# Patient Record
Sex: MALE | Race: WHITE | NOT HISPANIC OR LATINO | Employment: FULL TIME | URBAN - METROPOLITAN AREA
[De-identification: names, ages, dates, MRNs, and addresses within clinical notes are randomized per-mention and may not be internally consistent; named-entity substitution may affect disease eponyms.]

---

## 2022-05-03 ENCOUNTER — HOSPITAL ENCOUNTER (OUTPATIENT)
Dept: CARDIOLOGY | Facility: HOSPITAL | Age: 32
Discharge: HOME | End: 2022-05-03

## 2022-05-03 ENCOUNTER — TRANSCRIBE ORDERS (OUTPATIENT)
Dept: CARDIOLOGY | Facility: CLINIC | Age: 32
End: 2022-05-03

## 2022-05-03 DIAGNOSIS — Z00.00 ROUTINE GENERAL MEDICAL EXAMINATION AT A HEALTH CARE FACILITY: ICD-10-CM

## 2022-05-03 DIAGNOSIS — Z00.00 ROUTINE GENERAL MEDICAL EXAMINATION AT A HEALTH CARE FACILITY: Primary | ICD-10-CM

## 2022-05-03 LAB
BH CV STRESS BP STAGE 1: NORMAL
BH CV STRESS BP STAGE 2: NORMAL
BH CV STRESS BP STAGE 3: NORMAL
BH CV STRESS BP STAGE 4: NORMAL
BH CV STRESS BP STAGE 5: NORMAL
BH CV STRESS DURATION MIN STAGE 1: 3
BH CV STRESS DURATION MIN STAGE 2: 3
BH CV STRESS DURATION MIN STAGE 3: 3
BH CV STRESS DURATION MIN STAGE 4: 3
BH CV STRESS DURATION MIN STAGE 5: 3
BH CV STRESS DURATION SEC STAGE 1: 0
BH CV STRESS DURATION SEC STAGE 2: 0
BH CV STRESS DURATION SEC STAGE 3: 0
BH CV STRESS DURATION SEC STAGE 4: 0
BH CV STRESS DURATION SEC STAGE 5: 0
BH CV STRESS GRADE STAGE 1: 10
BH CV STRESS GRADE STAGE 2: 12
BH CV STRESS GRADE STAGE 3: 14
BH CV STRESS GRADE STAGE 4: 16
BH CV STRESS GRADE STAGE 5: 18
BH CV STRESS HR STAGE 1: 71
BH CV STRESS HR STAGE 2: 89
BH CV STRESS HR STAGE 3: 109
BH CV STRESS HR STAGE 4: 125
BH CV STRESS HR STAGE 5: 164
BH CV STRESS METS STAGE 1: 5
BH CV STRESS METS STAGE 2: 7.5
BH CV STRESS METS STAGE 3: 10
BH CV STRESS METS STAGE 4: 13.5
BH CV STRESS METS STAGE 5: 15
BH CV STRESS PROTOCOL 1: NORMAL
BH CV STRESS RECOVERY BP: NORMAL MMHG
BH CV STRESS RECOVERY HR: 82 BPM
BH CV STRESS SPEED STAGE 1: 1.7
BH CV STRESS SPEED STAGE 2: 2.5
BH CV STRESS SPEED STAGE 3: 3.4
BH CV STRESS SPEED STAGE 4: 4.2
BH CV STRESS SPEED STAGE 5: 5
BH CV STRESS STAGE 1: 1
BH CV STRESS STAGE 2: 2
BH CV STRESS STAGE 3: 3
BH CV STRESS STAGE 4: 4
BH CV STRESS STAGE 5: 5
MAXIMAL PREDICTED HEART RATE: 186 BPM
PERCENT MAX PREDICTED HR: 88.17 %
STRESS BASELINE BP: NORMAL MMHG
STRESS BASELINE HR: 54 BPM
STRESS PERCENT HR: 104 %
STRESS POST ESTIMATED WORKLOAD: 15 METS
STRESS POST EXERCISE DUR MIN: 15 MIN
STRESS POST EXERCISE DUR SEC: 0 SEC
STRESS POST PEAK BP: NORMAL MMHG
STRESS POST PEAK HR: 164 BPM
STRESS TARGET HR: 158 BPM

## 2022-05-03 PROCEDURE — 93016 CV STRESS TEST SUPVJ ONLY: CPT | Performed by: INTERNAL MEDICINE

## 2022-05-03 PROCEDURE — 93018 CV STRESS TEST I&R ONLY: CPT | Performed by: INTERNAL MEDICINE

## 2022-05-03 PROCEDURE — 93017 CV STRESS TEST TRACING ONLY: CPT

## 2022-05-04 ENCOUNTER — TRANSCRIBE ORDERS (OUTPATIENT)
Dept: CARDIOLOGY | Facility: CLINIC | Age: 32
End: 2022-05-04

## 2024-02-09 ENCOUNTER — OFFICE VISIT (OUTPATIENT)
Dept: GASTROENTEROLOGY | Facility: CLINIC | Age: 34
End: 2024-02-09
Payer: COMMERCIAL

## 2024-02-09 ENCOUNTER — TELEPHONE (OUTPATIENT)
Dept: GASTROENTEROLOGY | Facility: CLINIC | Age: 34
End: 2024-02-09
Payer: COMMERCIAL

## 2024-02-09 VITALS
TEMPERATURE: 97.7 F | HEIGHT: 67 IN | OXYGEN SATURATION: 98 % | HEART RATE: 48 BPM | DIASTOLIC BLOOD PRESSURE: 91 MMHG | BODY MASS INDEX: 32.65 KG/M2 | SYSTOLIC BLOOD PRESSURE: 132 MMHG | WEIGHT: 208 LBS

## 2024-02-09 DIAGNOSIS — R10.13 DYSPEPSIA: Primary | ICD-10-CM

## 2024-02-09 DIAGNOSIS — R11.10 REGURGITATION OF FOOD: ICD-10-CM

## 2024-02-09 PROCEDURE — 99204 OFFICE O/P NEW MOD 45 MIN: CPT | Performed by: NURSE PRACTITIONER

## 2024-02-09 RX ORDER — HYDROCODONE BITARTRATE AND ACETAMINOPHEN 5; 325 MG/1; MG/1
TABLET ORAL
COMMUNITY
Start: 2023-10-30 | End: 2024-02-09

## 2024-02-09 RX ORDER — PAROXETINE HYDROCHLORIDE 20 MG/1
1 TABLET, FILM COATED ORAL NIGHTLY
COMMUNITY

## 2024-02-09 RX ORDER — CEPHALEXIN 500 MG/1
1 CAPSULE ORAL EVERY 12 HOURS SCHEDULED
COMMUNITY
Start: 2023-10-30 | End: 2024-02-09

## 2024-02-09 NOTE — PROGRESS NOTES
Chief Complaint   Patient presents with    Heartburn       HPI    Vidal Sales is a  33 y.o. male here to establish care as a new patient for complaints of dyspepsia.    This patient will also follow with Dr. Meeks.    Patient reports several years of dyspeptic symptoms.  Symptoms wax and wane in severity.  Can often experience regurgitation of stomach contents.  No nausea, vomiting, dysphagia or odynophagia. His appetite is good.  His weight is stable.  He has been self managing symptoms with over-the-counter H2 blockers and PPI.  He is currently on esomeprazole 20 mg OTC once daily.  He does not smoke.  He rarely drinks alcohol.  He rarely uses NSAIDs.    No diarrhea, constipation or rectal bleeding.  No family history of colon cancer.      He works as a .  His wife is a physician assistant here at [facility] [employer].  He has 3 young children.    He just had lab work for annual wellness unavailable for review.  We will request results.    Past Medical History:   Diagnosis Date    GERD (gastroesophageal reflux disease) 01/2015       History reviewed. No pertinent surgical history.    Scheduled Meds:     Continuous Infusions: No current facility-administered medications for this visit.      PRN Meds:     No Known Allergies    Social History     Socioeconomic History    Marital status:    Tobacco Use    Smoking status: Never   Substance and Sexual Activity    Alcohol use: Yes     Alcohol/week: 5.0 standard drinks of alcohol     Types: 2 Glasses of wine, 3 Drinks containing 0.5 oz of alcohol per week    Drug use: Never    Sexual activity: Yes     Partners: Female     Birth control/protection: Vasectomy       History reviewed. No pertinent family history.    Review of Systems   Gastrointestinal:         + dyspepsia        Vitals:    02/09/24 1305   BP: 132/91   Pulse: (!) 48   Temp: 97.7 °F (36.5 °C)   SpO2: 98%       Physical Exam  Constitutional:       Appearance: He is well-developed.   Abdominal:       General: Bowel sounds are normal. There is no distension.      Palpations: Abdomen is soft. There is no mass.      Tenderness: There is no abdominal tenderness. There is no guarding.      Hernia: No hernia is present.   Skin:     General: Skin is warm and dry.      Capillary Refill: Capillary refill takes less than 2 seconds.   Neurological:      Mental Status: He is alert and oriented to person, place, and time.   Psychiatric:         Behavior: Behavior normal.     Assessment    Diagnoses and all orders for this visit:    1. Dyspepsia (Primary)  -     Case Request; Standing  -     Obtain Informed Consent; Standing  -     Prepare and Witness Surgical Consent Form; Standing  -     Case Request    2. Regurgitation of food  -     Case Request; Standing  -     Obtain Informed Consent; Standing  -     Prepare and Witness Surgical Consent Form; Standing  -     Case Request    Plan    Schedule EGD with Dr. Meeks for dyspeptic symptoms and regurgitation of food to rule out acid related damage, peptic ulcer disease, Terrell's esophagus, H. pylori infection, hiatal hernia, celiac disease, etc.  Risk/benefits of procedure reviewed with the patient and all questions were answered  Offered Protonix 40 mg once daily but patient prefers to stay on over-the-counter esomeprazole for now.  Antireflux measures and dietary modifications reviewed. Low acid diet reviewed. Keep head of bed elevated. Stop eating/drinking at least 3 hours prior to bedtime. Eliminate caffeine and carbonated beverages.  Weight loss encouraged if BMI over 25.  Obtain a copy of labs from primary care provider for review.  Further recommendations pending endoscopic examination.         MARIA DOLORES Lares  [facility]  [address] [place] [address]  Office: [phone]

## 2024-02-09 NOTE — Clinical Note
I need a copy of the labs he had performed for a wellness exam please call [phone] to request thanks

## 2024-02-09 NOTE — H&P (VIEW-ONLY)
Chief Complaint   Patient presents with    Heartburn       HPI    Vidal Sales is a  33 y.o. male here to establish care as a new patient for complaints of dyspepsia.    This patient will also follow with Dr. Meeks.    Patient reports several years of dyspeptic symptoms.  Symptoms wax and wane in severity.  Can often experience regurgitation of stomach contents.  No nausea, vomiting, dysphagia or odynophagia. His appetite is good.  His weight is stable.  He has been self managing symptoms with over-the-counter H2 blockers and PPI.  He is currently on esomeprazole 20 mg OTC once daily.  He does not smoke.  He rarely drinks alcohol.  He rarely uses NSAIDs.    No diarrhea, constipation or rectal bleeding.  No family history of colon cancer.      He works as a .  His wife is a physician assistant here at [employer].  He has 3 young children.    He just had lab work for annual wellness unavailable for review.  We will request results.    Past Medical History:   Diagnosis Date    GERD (gastroesophageal reflux disease) 01/2015       History reviewed. No pertinent surgical history.    Scheduled Meds:     Continuous Infusions: No current facility-administered medications for this visit.      PRN Meds:     No Known Allergies    Social History     Socioeconomic History    Marital status:    Tobacco Use    Smoking status: Never   Substance and Sexual Activity    Alcohol use: Yes     Alcohol/week: 5.0 standard drinks of alcohol     Types: 2 Glasses of wine, 3 Drinks containing 0.5 oz of alcohol per week    Drug use: Never    Sexual activity: Yes     Partners: Female     Birth control/protection: Vasectomy       History reviewed. No pertinent family history.    Review of Systems   Gastrointestinal:         + dyspepsia        Vitals:    02/09/24 1305   BP: 132/91   Pulse: (!) 48   Temp: 97.7 °F (36.5 °C)   SpO2: 98%       Physical Exam  Constitutional:       Appearance: He is well-developed.   Abdominal:       General: Bowel sounds are normal. There is no distension.      Palpations: Abdomen is soft. There is no mass.      Tenderness: There is no abdominal tenderness. There is no guarding.      Hernia: No hernia is present.   Skin:     General: Skin is warm and dry.      Capillary Refill: Capillary refill takes less than 2 seconds.   Neurological:      Mental Status: He is alert and oriented to person, place, and time.   Psychiatric:         Behavior: Behavior normal.     Assessment    Diagnoses and all orders for this visit:    1. Dyspepsia (Primary)  -     Case Request; Standing  -     Obtain Informed Consent; Standing  -     Prepare and Witness Surgical Consent Form; Standing  -     Case Request    2. Regurgitation of food  -     Case Request; Standing  -     Obtain Informed Consent; Standing  -     Prepare and Witness Surgical Consent Form; Standing  -     Case Request    Plan    Schedule EGD with Dr. Meeks for dyspeptic symptoms and regurgitation of food to rule out acid related damage, peptic ulcer disease, Terrell's esophagus, H. pylori infection, hiatal hernia, celiac disease, etc.  Risk/benefits of procedure reviewed with the patient and all questions were answered  Offered Protonix 40 mg once daily but patient prefers to stay on over-the-counter esomeprazole for now.  Antireflux measures and dietary modifications reviewed. Low acid diet reviewed. Keep head of bed elevated. Stop eating/drinking at least 3 hours prior to bedtime. Eliminate caffeine and carbonated beverages.  Weight loss encouraged if BMI over 25.  Obtain a copy of labs from primary care provider for review.  Further recommendations pending endoscopic examination.         MARIA DOLORES Lares  [facility]  [address] [place] [address]  Office: [phone]

## 2024-02-09 NOTE — TELEPHONE ENCOUNTER
JESSICA RUBIO IN SCHEDULING PT SCHEDULED 02/16/2024 ARRIVING AT 1200PM.EGD PREP INSTRUCTIONS HANDED TO THE PT.OK FOR HUB TO READ

## 2024-02-14 RX ORDER — DIPHENOXYLATE HYDROCHLORIDE AND ATROPINE SULFATE 2.5; .025 MG/1; MG/1
1 TABLET ORAL DAILY
COMMUNITY

## 2024-02-14 RX ORDER — CHLORAL HYDRATE 500 MG
1000 CAPSULE ORAL
COMMUNITY

## 2024-02-15 ENCOUNTER — TELEPHONE (OUTPATIENT)
Dept: GASTROENTEROLOGY | Facility: CLINIC | Age: 34
End: 2024-02-15
Payer: COMMERCIAL

## 2024-02-16 ENCOUNTER — ANESTHESIA (OUTPATIENT)
Dept: GASTROENTEROLOGY | Facility: HOSPITAL | Age: 34
End: 2024-02-16
Payer: COMMERCIAL

## 2024-02-16 ENCOUNTER — ANESTHESIA EVENT (OUTPATIENT)
Dept: GASTROENTEROLOGY | Facility: HOSPITAL | Age: 34
End: 2024-02-16
Payer: COMMERCIAL

## 2024-02-16 ENCOUNTER — HOSPITAL ENCOUNTER (OUTPATIENT)
Facility: HOSPITAL | Age: 34
Setting detail: HOSPITAL OUTPATIENT SURGERY
Discharge: HOME | End: 2024-02-16
Attending: INTERNAL MEDICINE | Admitting: INTERNAL MEDICINE
Payer: COMMERCIAL

## 2024-02-16 VITALS
WEIGHT: 208 LBS | DIASTOLIC BLOOD PRESSURE: 78 MMHG | OXYGEN SATURATION: 97 % | RESPIRATION RATE: 16 BRPM | SYSTOLIC BLOOD PRESSURE: 108 MMHG | BODY MASS INDEX: 32.58 KG/M2 | HEART RATE: 45 BPM

## 2024-02-16 DIAGNOSIS — R10.13 DYSPEPSIA: ICD-10-CM

## 2024-02-16 DIAGNOSIS — R11.10 REGURGITATION OF FOOD: ICD-10-CM

## 2024-02-16 PROCEDURE — 88305 TISSUE EXAM BY PATHOLOGIST: CPT | Performed by: INTERNAL MEDICINE

## 2024-02-16 PROCEDURE — 25010000002 PROPOFOL 10 MG/ML EMULSION

## 2024-02-16 PROCEDURE — 25010000002 GLYCOPYRROLATE 0.2 MG/ML SOLUTION

## 2024-02-16 PROCEDURE — 43239 EGD BIOPSY SINGLE/MULTIPLE: CPT | Performed by: INTERNAL MEDICINE

## 2024-02-16 PROCEDURE — 25810000003 LACTATED RINGERS PER 1000 ML: Performed by: INTERNAL MEDICINE

## 2024-02-16 RX ORDER — PROPOFOL 10 MG/ML
VIAL (ML) INTRAVENOUS CONTINUOUS PRN
Status: DISCONTINUED | OUTPATIENT
Start: 2024-02-16 | End: 2024-02-16 | Stop reason: SURG

## 2024-02-16 RX ORDER — GLYCOPYRROLATE 0.2 MG/ML
INJECTION INTRAMUSCULAR; INTRAVENOUS AS NEEDED
Status: DISCONTINUED | OUTPATIENT
Start: 2024-02-16 | End: 2024-02-16 | Stop reason: SURG

## 2024-02-16 RX ORDER — LIDOCAINE HYDROCHLORIDE 20 MG/ML
INJECTION, SOLUTION INFILTRATION; PERINEURAL AS NEEDED
Status: DISCONTINUED | OUTPATIENT
Start: 2024-02-16 | End: 2024-02-16 | Stop reason: SURG

## 2024-02-16 RX ORDER — SODIUM CHLORIDE, SODIUM LACTATE, POTASSIUM CHLORIDE, CALCIUM CHLORIDE 600; 310; 30; 20 MG/100ML; MG/100ML; MG/100ML; MG/100ML
1000 INJECTION, SOLUTION INTRAVENOUS CONTINUOUS
Status: DISCONTINUED | OUTPATIENT
Start: 2024-02-16 | End: 2024-02-16 | Stop reason: HOSPADM

## 2024-02-16 RX ADMIN — PROPOFOL 100 MG: 10 INJECTION, EMULSION INTRAVENOUS at 13:04

## 2024-02-16 RX ADMIN — LIDOCAINE HYDROCHLORIDE 60 MG: 20 INJECTION, SOLUTION INFILTRATION; PERINEURAL at 13:04

## 2024-02-16 RX ADMIN — PROPOFOL 160 MCG/KG/MIN: 10 INJECTION, EMULSION INTRAVENOUS at 13:05

## 2024-02-16 RX ADMIN — GLYCOPYRROLATE 0.2 MG: 0.2 INJECTION INTRAMUSCULAR; INTRAVENOUS at 13:09

## 2024-02-16 RX ADMIN — SODIUM CHLORIDE, POTASSIUM CHLORIDE, SODIUM LACTATE AND CALCIUM CHLORIDE 1000 ML: 600; 310; 30; 20 INJECTION, SOLUTION INTRAVENOUS at 12:44

## 2024-02-16 NOTE — DISCHARGE INSTRUCTIONS

## 2024-02-16 NOTE — BRIEF OP NOTE
ESOPHAGOGASTRODUODENOSCOPY  Progress Note    Vidal Sales  2/16/2024    Pre-op Diagnosis:   Dyspepsia [R10.13]  Regurgitation of food [R11.10]       Post-Op Diagnosis Codes:     * Dyspepsia [R10.13]     * Regurgitation of food [R11.10]     * Gastritis [K29.70]     * Hiatal hernia [K44.9]     * Esophagitis [K20.90]    Procedure/CPT® Codes:        Procedure(s):  ESOPHAGOGASTRODUODENOSCOPY with biopsies              Surgeon(s):  Salvador Meeks MD    Anesthesia: Monitored Anesthesia Care    Staff:   Endo Technician: Bhargavi Medellin  Endo Nurse: Jessica Loco RN         Estimated Blood Loss: minimal    Urine Voided: * No values recorded between 2/16/2024  1:02 PM and 2/16/2024  1:14 PM *    Specimens:                Specimens       ID Source Type Tests Collected By Collected At Frozen?    A Gastric, Antrum Tissue TISSUE PATHOLOGY EXAM   Salvador Meeks MD 2/16/24 1312     Description: antral tissue biopsies    B Esophagus, Distal Tissue TISSUE PATHOLOGY EXAM   Salvador Meeks MD 2/16/24 1313                   Drains: * No LDAs found *    Findings: EGD with biopsy revealed salmon-colored mucosa times several fingers approximately 1 to 1.2 cm distal esophagus biopsies obtained to rule out Terrell's esophagus.  Medium size hiatal hernia was identified with antral gastritis biopsies of the antrum were obtained.  Duodenum was normal throughout.        Complications: None          Salvador Meeks MD     Date: 2/16/2024  Time: 13:16 EST

## 2024-02-19 LAB
LAB AP CASE REPORT: NORMAL
PATH REPORT.FINAL DX SPEC: NORMAL
PATH REPORT.GROSS SPEC: NORMAL

## 2024-03-13 ENCOUNTER — OFFICE VISIT (OUTPATIENT)
Dept: GASTROENTEROLOGY | Facility: CLINIC | Age: 34
End: 2024-03-13
Payer: COMMERCIAL

## 2024-03-13 VITALS
OXYGEN SATURATION: 97 % | BODY MASS INDEX: 29.92 KG/M2 | SYSTOLIC BLOOD PRESSURE: 121 MMHG | TEMPERATURE: 98.6 F | HEIGHT: 70 IN | DIASTOLIC BLOOD PRESSURE: 81 MMHG | WEIGHT: 209 LBS | HEART RATE: 49 BPM

## 2024-03-13 DIAGNOSIS — K44.9 HIATAL HERNIA: ICD-10-CM

## 2024-03-13 DIAGNOSIS — K21.00 GASTROESOPHAGEAL REFLUX DISEASE WITH ESOPHAGITIS WITHOUT HEMORRHAGE: Primary | ICD-10-CM

## 2024-03-13 PROCEDURE — 99214 OFFICE O/P EST MOD 30 MIN: CPT | Performed by: NURSE PRACTITIONER

## 2024-03-13 RX ORDER — PANTOPRAZOLE SODIUM 40 MG/1
40 TABLET, DELAYED RELEASE ORAL DAILY
Qty: 90 TABLET | Refills: 0 | Status: SHIPPED | OUTPATIENT
Start: 2024-03-13

## 2024-03-13 NOTE — PROGRESS NOTES
Chief Complaint   Patient presents with    Dyspepsia       HPI    Vidal Sales is a  34 y.o. male here for a follow up visit for dyspepsia status post endoscopic examination.    This patient follows with Dr. Meeks and myself.    He underwent EGD 2/16/2024 reviewed with findings of grade C reflux esophagitis.  Medium size hiatal hernia.  Gastritis.  Normal duodenum.  Gastritis noted on biopsy, negative for H. pylori and Terrell's esophagus.    On visit today he reports reduction in dyspeptic symptoms on over-the-counter Nexium.  Denies odynophagia, dysphagia, nausea or vomiting.  He is trying to adhere to a antireflux diet which is often a challenge given his busy work schedule as a .  Appetite is good.  His weight is stable.    No lower GI complaints.    Past Medical History:   Diagnosis Date    GERD (gastroesophageal reflux disease) 01/2015       Past Surgical History:   Procedure Laterality Date    ENDOSCOPY N/A 2/16/2024    Procedure: ESOPHAGOGASTRODUODENOSCOPY with biopsies;  Surgeon: Salvador Meeks MD;  [facility] ENDOSCOPY;  Service: Gastroenterology;  Laterality: N/A;  pre: dyspepsia  post: esophagitis, gastritis, hiatal hernia    NO PAST SURGERIES         Scheduled Meds:     Continuous Infusions: No current facility-administered medications for this visit.      PRN Meds:     No Known Allergies    Social History     Socioeconomic History    Marital status:    Tobacco Use    Smoking status: Never   Substance and Sexual Activity    Alcohol use: Yes     Alcohol/week: 5.0 standard drinks of alcohol     Types: 2 Glasses of wine, 3 Drinks containing 0.5 oz of alcohol per week    Drug use: Never    Sexual activity: Yes     Partners: Female     Birth control/protection: Vasectomy       Family History   Problem Relation Age of Onset    Malig Hyperthermia Neg Hx        Review of Systems   HENT:  Negative for trouble swallowing.    Gastrointestinal:         + Dyspepsia       Vitals:     03/13/24 1011   BP: 121/81   Pulse: (!) 49   Temp: 98.6 °F (37 °C)   SpO2: 97%       Physical Exam  Constitutional:       Appearance: He is well-developed.   Abdominal:      General: Bowel sounds are normal. There is no distension.      Palpations: Abdomen is soft. There is no mass.      Tenderness: There is no abdominal tenderness. There is no guarding.      Hernia: No hernia is present.   Skin:     General: Skin is warm and dry.      Capillary Refill: Capillary refill takes less than 2 seconds.   Neurological:      Mental Status: He is alert and oriented to person, place, and time.   Psychiatric:         Behavior: Behavior normal.     Assessment    Diagnoses and all orders for this visit:    1. Gastroesophageal reflux disease with esophagitis without hemorrhage (Primary)    2. Hiatal hernia    Other orders  -     pantoprazole (PROTONIX) 40 MG EC tablet; Take 1 tablet by mouth Daily.  Dispense: 90 tablet; Refill: 0       Plan    Delightful 34-year-old male seen today in follow-up for dyspeptic symptoms status post EGD as summarized above.  Reviewed procedural findings with the patient as well as pathology and all questions were answered.  Reiterated antireflux measures and dietary modifications. Low acid diet. Keep head of bed elevated. Stop eating/drinking at least 3 hours prior to bedtime. Eliminate caffeine and carbonated beverages.  We will give 3 months of Protonix 40 mg once daily to heal inflammation at which point patient can reduce down to over-the-counter PPI therapy and potentially Pepcid.  He is to call us with any issues otherwise we will see him back as needed.         MARIA DOLORES Lares  [facility]  [address] [place] [address]  Office: [phone]

## 2024-06-07 RX ORDER — PANTOPRAZOLE SODIUM 40 MG/1
40 TABLET, DELAYED RELEASE ORAL DAILY
Qty: 30 TABLET | OUTPATIENT
Start: 2024-06-07

## (undated) DEVICE — TBG SXN CONN/F UNIV 1/4IN 10FT LF STRL

## (undated) DEVICE — SENSR O2 OXIMAX FNGR A/ 18IN NONSTR

## (undated) DEVICE — ADAPT CLN BIOGUARD AIR/H2O DISP

## (undated) DEVICE — CANN O2 ETCO2 FITS ALL CONN CO2 SMPL A/ 7IN DISP LF

## (undated) DEVICE — BLCK/BITE BLOX W/DENTL/RIM W/STRAP 54F

## (undated) DEVICE — KT ORCA ORCAPOD DISP STRL

## (undated) DEVICE — LN SMPL CO2 SHTRM SD STREAM W/M LUER

## (undated) DEVICE — FRCP BX RADJAW4 NDL 2.8 240CM LG OG BX40